# Patient Record
Sex: FEMALE | Race: OTHER | Employment: OTHER | URBAN - METROPOLITAN AREA
[De-identification: names, ages, dates, MRNs, and addresses within clinical notes are randomized per-mention and may not be internally consistent; named-entity substitution may affect disease eponyms.]

---

## 2021-05-04 ENCOUNTER — APPOINTMENT (OUTPATIENT)
Dept: CT IMAGING | Facility: HOSPITAL | Age: 86
End: 2021-05-04
Attending: EMERGENCY MEDICINE
Payer: MEDICAID

## 2021-05-04 ENCOUNTER — APPOINTMENT (OUTPATIENT)
Dept: GENERAL RADIOLOGY | Facility: HOSPITAL | Age: 86
End: 2021-05-04
Attending: EMERGENCY MEDICINE
Payer: MEDICAID

## 2021-05-04 PROCEDURE — 71045 X-RAY EXAM CHEST 1 VIEW: CPT | Performed by: EMERGENCY MEDICINE

## 2021-05-04 PROCEDURE — 70450 CT HEAD/BRAIN W/O DYE: CPT | Performed by: EMERGENCY MEDICINE

## 2021-05-04 NOTE — ED PROVIDER NOTES
Patient Seen in: [facility] Emergency Department      History   Patient presents with:  Altered Mental Status  Abnormal Result    Stated Complaint: concerns for abnormal mental behavior and abnormal electrolyte levels, sodium 1*    HPI/Subjective: Temp src Temporal   SpO2 100 %   O2 Device None (Room air)       Current:BP (!) 161/59   Pulse 51   Temp 98.1 °F (36.7 °C) (Temporal)   Resp 13   Ht 152.4 cm (5')   Wt 70.3 kg   SpO2 100%   BMI 30.27 kg/m²         Physical Exam  Vitals and nursing note r were created for panel order CBC WITH DIFFERENTIAL WITH PLATELET.   Procedure                               Abnormality         Status                     ---------                               -----------         ------                     CBC W/ DIFFERALEXIS recommend MRI of the brain with diffusion-weighted imaging for further evaluation.    Dictated by (CST): Ghassan Aguilar MD on 5/04/2021 at 1:33 PM     Finalized by (CST): Ghassan Aguilar MD on 5/04/2021 at 1:34 PM       XR CHEST AP PORTABLE  (CPT=71045)    Result Da Disposition and Plan     Clinical Impression:  Cognitive and behavioral changes  (primary encounter diagnosis)  Hyponatremia  Iron deficiency anemia, unspecified iron deficiency anemia type     Disposition:  Discharge  5/4/2021  3:49 pm    Follow-u

## 2021-05-04 NOTE — ED INITIAL ASSESSMENT (HPI)
Alert, awake per son pt had low sodium levels while in [facility] last week, has been confused since then, unknown sodium level today, also very swollen fingers where she is unable to  stuff, Pt quiet with no complaints at this time. Denies chest pain or

## 2021-05-10 ENCOUNTER — OFFICE VISIT (OUTPATIENT)
Dept: NEUROLOGY | Facility: CLINIC | Age: 86
End: 2021-05-10

## 2021-05-10 VITALS
WEIGHT: 154 LBS | BODY MASS INDEX: 30 KG/M2 | HEART RATE: 60 BPM | RESPIRATION RATE: 16 BRPM | SYSTOLIC BLOOD PRESSURE: 116 MMHG | DIASTOLIC BLOOD PRESSURE: 60 MMHG

## 2021-05-10 DIAGNOSIS — R41.0 CONFUSION: Primary | ICD-10-CM

## 2021-05-10 DIAGNOSIS — R26.9 GAIT DISTURBANCE: ICD-10-CM

## 2021-05-10 DIAGNOSIS — R41.0 DISORIENTATION: ICD-10-CM

## 2021-05-10 DIAGNOSIS — E87.1 HYPONATREMIA: ICD-10-CM

## 2021-05-10 DIAGNOSIS — R41.3 SHORT-TERM MEMORY LOSS: ICD-10-CM

## 2021-05-10 PROCEDURE — 3078F DIAST BP <80 MM HG: CPT | Performed by: OTHER

## 2021-05-10 PROCEDURE — 3074F SYST BP LT 130 MM HG: CPT | Performed by: OTHER

## 2021-05-10 PROCEDURE — 99204 OFFICE O/P NEW MOD 45 MIN: CPT | Performed by: OTHER

## 2021-05-10 RX ORDER — ATENOLOL 50 MG/1
50 TABLET ORAL DAILY
COMMUNITY
End: 2021-07-26

## 2021-05-10 NOTE — PROGRESS NOTES
Patient's son states patient's hands were very swollen 2 weeks ago and she was prescribed Telmiride in [facility] and her sodium was very low so son brought her back here.  Since then, she doesn't remember what she is supposed to do, hs had very poor short term

## 2021-05-10 NOTE — PROGRESS NOTES
Michael [address]   Neurology; INITIAL CLINIC VISIT  5/10/2021, 2:51 PM     Vearl Gilford Patient Status:  No patient class for patient encounter    1935 YONY [id number]   Location [address] [place] [address] does not drink alcohol and does not use drugs. ALLERGIES:  No Known Allergies    MEDICATIONS:  Current Outpatient Medications   Medication Sig Dispense Refill   • atenolol 50 MG Oral Tab Take 50 mg by mouth daily.      • aspirin 81 MG Oral Tab Take 81 mg follows one step of commends, very slow, with visual cue, full MMS is not able to do at this time,   Cranial Nerves       CN II:  Visual fields full, Pupils equal and reactive, Fundi normal       CN III, IV, VI:  Horrizontal and vertical movements normal. loss  Plan: MRI BRAIN (W+WO) (CPT=70553), EEG    (R26.9) Gait disturbance  Plan: OP REFERRAL TO [facility]        [place]:  Clinically, This  patient presented with mental confusion last months, loss of short term memory, I feel she ha

## 2021-05-29 ENCOUNTER — APPOINTMENT (OUTPATIENT)
Dept: ULTRASOUND IMAGING | Facility: HOSPITAL | Age: 86
End: 2021-05-29
Attending: EMERGENCY MEDICINE
Payer: MEDICAID

## 2021-05-29 ENCOUNTER — APPOINTMENT (OUTPATIENT)
Dept: CT IMAGING | Facility: HOSPITAL | Age: 86
End: 2021-05-29
Attending: EMERGENCY MEDICINE
Payer: MEDICAID

## 2021-05-29 ENCOUNTER — HOSPITAL ENCOUNTER (EMERGENCY)
Facility: HOSPITAL | Age: 86
Discharge: HOME | End: 2021-05-29
Attending: EMERGENCY MEDICINE
Payer: MEDICAID

## 2021-05-29 VITALS
WEIGHT: 145 LBS | TEMPERATURE: 99 F | SYSTOLIC BLOOD PRESSURE: 139 MMHG | BODY MASS INDEX: 30.44 KG/M2 | HEART RATE: 77 BPM | HEIGHT: 58 IN | OXYGEN SATURATION: 100 % | DIASTOLIC BLOOD PRESSURE: 78 MMHG | RESPIRATION RATE: 17 BRPM

## 2021-05-29 DIAGNOSIS — M79.89 SWELLING OF ARM: Primary | ICD-10-CM

## 2021-05-29 DIAGNOSIS — E87.1 HYPONATREMIA: ICD-10-CM

## 2021-05-29 PROCEDURE — 99284 EMERGENCY DEPT VISIT MOD MDM: CPT

## 2021-05-29 PROCEDURE — 80053 COMPREHEN METABOLIC PANEL: CPT | Performed by: EMERGENCY MEDICINE

## 2021-05-29 PROCEDURE — 85025 COMPLETE CBC W/AUTO DIFF WBC: CPT | Performed by: EMERGENCY MEDICINE

## 2021-05-29 PROCEDURE — 71275 CT ANGIOGRAPHY CHEST: CPT | Performed by: EMERGENCY MEDICINE

## 2021-05-29 PROCEDURE — 96361 HYDRATE IV INFUSION ADD-ON: CPT

## 2021-05-29 PROCEDURE — 93971 EXTREMITY STUDY: CPT | Performed by: EMERGENCY MEDICINE

## 2021-05-29 PROCEDURE — 99285 EMERGENCY DEPT VISIT HI MDM: CPT

## 2021-05-29 PROCEDURE — 96360 HYDRATION IV INFUSION INIT: CPT

## 2021-05-29 RX ORDER — SODIUM CHLORIDE 9 MG/ML
125 INJECTION, SOLUTION INTRAVENOUS CONTINUOUS
Status: DISCONTINUED | OUTPATIENT
Start: 2021-05-29 | End: 2021-05-29

## 2021-05-29 NOTE — ED INITIAL ASSESSMENT (HPI)
R hand swelling that comes and goes since Monday.  Had 2nd COVID shot in same arm yesterday  Denies pain (aside from vaccine)  CMS intact, pulse normal

## 2021-05-30 NOTE — ED PROVIDER NOTES
Patient Seen in: [facility] Emergency Department      History   Patient presents with:  Swelling Edema    Stated Complaint: swelling to hand    HPI/Subjective:   HPI    Swelling of right hand- pain in hand and forearm, the swelling actually started as noted above.     Physical Exam     ED Triage Vitals [05/29/21 1441]   /82   Pulse 86   Resp 17   Temp 99.3 °F (37.4 °C)   Temp src Temporal   SpO2 100 %   O2 Device None (Room air)       Current:/78   Pulse 77   Temp 99.3 °F (37.4 °C) (Tempor BUN/CREA Ratio 23.6 (*)     GFR, Non- 59 (*)     Albumin 2.9 (*)     Globulin  4.6 (*)     A/G Ratio 0.6 (*)     All other components within normal limits   CBC W/ DIFFERENTIAL - Abnormal; Notable for the following components:    HGB 10.8 ( calcification noted. PLEURA:  No mass or effusion. CHEST WALL:  No mass or axillary adenopathy. LIMITED ABDOMEN:  Small hiatal hernia. BONES:  No bony lesion or fracture.       OTHER:  2 x 1 cm low CT density mass posterior right lobe of thy well.  This makes me very concerned that this could be a Pancoast tumor in her upper chest chest x-ray was benign, discussed the case with radiology recommended a Doppler of the arm especially with the elevated D-dimer in [facility] to make sure that this is no

## 2021-06-11 ENCOUNTER — OFFICE VISIT (OUTPATIENT)
Dept: FAMILY MEDICINE CLINIC | Facility: CLINIC | Age: 86
End: 2021-06-11
Payer: MEDICAID

## 2021-06-11 VITALS
DIASTOLIC BLOOD PRESSURE: 68 MMHG | BODY MASS INDEX: 30.02 KG/M2 | RESPIRATION RATE: 15 BRPM | SYSTOLIC BLOOD PRESSURE: 142 MMHG | HEIGHT: 58 IN | OXYGEN SATURATION: 97 % | HEART RATE: 78 BPM | WEIGHT: 143 LBS | TEMPERATURE: 98 F

## 2021-06-11 DIAGNOSIS — E87.1 HYPONATREMIA: ICD-10-CM

## 2021-06-11 DIAGNOSIS — F41.9 ANXIETY: ICD-10-CM

## 2021-06-11 DIAGNOSIS — I10 ESSENTIAL HYPERTENSION: ICD-10-CM

## 2021-06-11 DIAGNOSIS — R22.31 LOCALIZED SWELLING ON RIGHT HAND: ICD-10-CM

## 2021-06-11 DIAGNOSIS — Z82.69 FAMILY HISTORY OF GOUT: ICD-10-CM

## 2021-06-11 DIAGNOSIS — R41.3 SHORT-TERM MEMORY LOSS: ICD-10-CM

## 2021-06-11 DIAGNOSIS — Z00.00 ANNUAL PHYSICAL EXAM: Primary | ICD-10-CM

## 2021-06-11 PROCEDURE — 3077F SYST BP >= 140 MM HG: CPT | Performed by: FAMILY MEDICINE

## 2021-06-11 PROCEDURE — 99387 INIT PM E/M NEW PAT 65+ YRS: CPT | Performed by: FAMILY MEDICINE

## 2021-06-11 PROCEDURE — 3008F BODY MASS INDEX DOCD: CPT | Performed by: FAMILY MEDICINE

## 2021-06-11 PROCEDURE — 3078F DIAST BP <80 MM HG: CPT | Performed by: FAMILY MEDICINE

## 2021-06-11 RX ORDER — ATENOLOL 50 MG/1
50 TABLET ORAL DAILY
Qty: 90 TABLET | Refills: 0 | Status: SHIPPED | OUTPATIENT
Start: 2021-06-11 | End: 2021-08-27

## 2021-06-11 RX ORDER — CITALOPRAM 10 MG/1
10 TABLET ORAL DAILY
Qty: 90 TABLET | Refills: 0 | Status: SHIPPED | OUTPATIENT
Start: 2021-06-11 | End: 2021-08-27

## 2021-06-11 RX ORDER — AMLODIPINE BESYLATE 5 MG/1
5 TABLET ORAL DAILY
Qty: 90 TABLET | Refills: 0 | Status: SHIPPED | OUTPATIENT
Start: 2021-06-11 | End: 2021-07-26

## 2021-06-11 NOTE — PROGRESS NOTES
Patient presents with:  Physical: Annual      HPI:   Levi Floyd is a 80year old female who presents for a complete physical exam.  She is a new patient. 2 month ago she was in [facility]. Was having a lot of swelling.   In the ER doctor prescribed diure • aspirin 81 MG Oral Tab Take 81 mg by mouth daily.         Past Medical History:   Diagnosis Date   • Other and unspecified hyperlipidemia    • Unspecified essential hypertension       Past Surgical History:   Procedure Laterality Date   • KNEE REPLACEM possible. Right now she is dependent. 2. Essential hypertension  BP in the 265X systolic at home. A little better here. We will convert her medicines to the American equivalents. Son will update me with home blood pressures.   If they persist high,

## 2021-06-12 ENCOUNTER — HOSPITAL ENCOUNTER (OUTPATIENT)
Dept: MRI IMAGING | Facility: HOSPITAL | Age: 86
Discharge: HOME | End: 2021-06-12
Attending: Other
Payer: MEDICAID

## 2021-06-12 ENCOUNTER — LAB ENCOUNTER (OUTPATIENT)
Dept: LAB | Facility: HOSPITAL | Age: 86
End: 2021-06-12
Attending: FAMILY MEDICINE
Payer: MEDICAID

## 2021-06-12 DIAGNOSIS — Z82.69 FAMILY HISTORY OF GOUT: ICD-10-CM

## 2021-06-12 DIAGNOSIS — R22.31 LOCALIZED SWELLING ON RIGHT HAND: ICD-10-CM

## 2021-06-12 DIAGNOSIS — R41.3 SHORT-TERM MEMORY LOSS: ICD-10-CM

## 2021-06-12 DIAGNOSIS — I10 ESSENTIAL HYPERTENSION: ICD-10-CM

## 2021-06-12 PROCEDURE — 86038 ANTINUCLEAR ANTIBODIES: CPT

## 2021-06-12 PROCEDURE — A9575 INJ GADOTERATE MEGLUMI 0.1ML: HCPCS | Performed by: OTHER

## 2021-06-12 PROCEDURE — 36415 COLL VENOUS BLD VENIPUNCTURE: CPT

## 2021-06-12 PROCEDURE — 85652 RBC SED RATE AUTOMATED: CPT

## 2021-06-12 PROCEDURE — 86431 RHEUMATOID FACTOR QUANT: CPT

## 2021-06-12 PROCEDURE — 80053 COMPREHEN METABOLIC PANEL: CPT

## 2021-06-12 PROCEDURE — 84550 ASSAY OF BLOOD/URIC ACID: CPT

## 2021-06-12 PROCEDURE — 86200 CCP ANTIBODY: CPT

## 2021-06-12 PROCEDURE — 86140 C-REACTIVE PROTEIN: CPT

## 2021-06-12 PROCEDURE — 70553 MRI BRAIN STEM W/O & W/DYE: CPT | Performed by: OTHER

## 2021-06-15 ENCOUNTER — TELEPHONE (OUTPATIENT)
Dept: NEUROLOGY | Facility: CLINIC | Age: 86
End: 2021-06-15

## 2021-06-25 ENCOUNTER — NURSE ONLY (OUTPATIENT)
Dept: ELECTROPHYSIOLOGY | Facility: HOSPITAL | Age: 86
End: 2021-06-25
Attending: Other
Payer: MEDICAID

## 2021-06-25 DIAGNOSIS — R41.3 SHORT-TERM MEMORY LOSS: ICD-10-CM

## 2021-06-25 PROCEDURE — 95819 EEG AWAKE AND ASLEEP: CPT | Performed by: OTHER

## 2021-06-26 NOTE — PROCEDURES
[facility], [address] [place] [address]      PATIENT'S NAME: [place] [address] Kiana Olmedo Médicis PHYSICIAN: Dorian Mathews M.D.    PATIENT ACCOUNT #: [de-identified] LOCATION: [facility]   MEDICAL RECORD #: [id number] YOB: 1935

## 2021-06-29 ENCOUNTER — TELEPHONE (OUTPATIENT)
Dept: NEUROLOGY | Facility: CLINIC | Age: 86
End: 2021-06-29

## 2021-07-06 ENCOUNTER — APPOINTMENT (OUTPATIENT)
Dept: PHYSICAL THERAPY | Facility: HOSPITAL | Age: 86
End: 2021-07-06
Attending: Other
Payer: MEDICAID

## 2021-07-07 NOTE — PROGRESS NOTES
NEUROLOGICAL EVALUATION:   Referring Physician: Dr. Juan Roche  Diagnosis: Gait disturbance (R26.9)        Date of Service: 7/7/2021     PATIENT SUMMARY   Hadley Snellen is a 80year old female who presents to therapy today with complaints of declines in mobil decreased static and dynamic balance, impaired functional strength for stairs. Patient is at an elevated fall risk based on TUG, tandem stance, SLS, and FGA.  Functional deficits include but are not limited to ambulating on uneven surface or in narrow space reduced speed, short step length, often carrying SPC. Timed Up and Go (AD, time): 16.9s SPC sec  Fall Risk: yes    Functional Gait Assessment   Item Description Score (0 worst, 3 best)     ____1___1.  Gait Level Surface-  Time: ____>10s______seconds  ___2 ambulation  · Pt will improve FGA score to >22 to demonstrate decreased fall risk. · Pt will improve tandem stance to >10s to demonstrate reduced fall risk.    · Pt will be able to get up from the floor with mod I.  · Pt will be able to go up stairs with

## 2021-07-08 ENCOUNTER — OFFICE VISIT (OUTPATIENT)
Dept: PHYSICAL THERAPY | Facility: HOSPITAL | Age: 86
End: 2021-07-08
Attending: Other
Payer: MEDICAID

## 2021-07-08 DIAGNOSIS — R26.9 GAIT DISTURBANCE: ICD-10-CM

## 2021-07-08 PROCEDURE — 97110 THERAPEUTIC EXERCISES: CPT

## 2021-07-08 PROCEDURE — 97161 PT EVAL LOW COMPLEX 20 MIN: CPT

## 2021-07-12 ENCOUNTER — OFFICE VISIT (OUTPATIENT)
Dept: PHYSICAL THERAPY | Facility: HOSPITAL | Age: 86
End: 2021-07-12
Attending: Other
Payer: MEDICAID

## 2021-07-12 DIAGNOSIS — R26.9 GAIT DISTURBANCE: ICD-10-CM

## 2021-07-12 PROCEDURE — 97110 THERAPEUTIC EXERCISES: CPT

## 2021-07-12 PROCEDURE — 97112 NEUROMUSCULAR REEDUCATION: CPT

## 2021-07-12 NOTE — PROGRESS NOTES
Dx: Gait disturbance (R26.9)            Insurance (Authorized # of Visits):  [facility] 8 through 5/10/2022     Authorizing Physician: Dr. Mcclure Or  Next MD visit: none scheduled  Fall Risk: elevated         Precautions: Fall Risk, communication-requires  \"catching self\". Once stopped at end, patient also has posterior LOB requiring [place] to maintain.  Son has family goal of working on patient getting up from ground as patient had a recent fall out of [place] and son and grandson had to pick her up off of [place] TX#: 3/ Date:                 TX#: 4/ Date:                 TX#: 5/ Date:    Tx#: 6/   TE:   Nu-step x 5', L4, ubj hx  FSU 4\" step, no UEs x10 ea, CGA   LSU 4\" step and over, no UEs, 2 x 5, CGA  Mini lunge  X 8 ea with UE support and tactile cues for po

## 2021-07-14 ENCOUNTER — OFFICE VISIT (OUTPATIENT)
Dept: PHYSICAL THERAPY | Facility: HOSPITAL | Age: 86
End: 2021-07-14
Attending: Other
Payer: MEDICAID

## 2021-07-14 PROCEDURE — 97112 NEUROMUSCULAR REEDUCATION: CPT

## 2021-07-14 PROCEDURE — 97110 THERAPEUTIC EXERCISES: CPT

## 2021-07-14 NOTE — PROGRESS NOTES
Dx: Gait disturbance (R26.9)            Insurance (Authorized # of Visits):  [facility] 8 through 5/10/2022     Authorizing Physician: Dr. Domi Art  Next MD visit: none scheduled  Fall Risk: elevated         Precautions: Fall Risk, communication-requires  lowered step next visit but no UEs. Progressed to two reps of 2' crawl on mat table to work on tolerance and eventually progress to attempt of floor transfer if safe due to family goals.  Added FSU to home program as well and progressed to 3/4 tandem and te 43#  2 x 15  FSU 6\" step, unilateral UE x10 ea, CGA   LSU 6\" step and over, BUEs, 2 x 5, CGA  HEP progression with handout        NM:  Sand dune:   *march 2 x 10, BUE support  Mod tandem ball throw CGA safety  Retro walking with CGA at belt 4 x 25', Abimbola

## 2021-07-19 ENCOUNTER — OFFICE VISIT (OUTPATIENT)
Dept: PHYSICAL THERAPY | Facility: HOSPITAL | Age: 86
End: 2021-07-19
Attending: Other
Payer: MEDICAID

## 2021-07-19 PROCEDURE — 97112 NEUROMUSCULAR REEDUCATION: CPT

## 2021-07-19 PROCEDURE — 97110 THERAPEUTIC EXERCISES: CPT

## 2021-07-19 NOTE — PROGRESS NOTES
Dx: Gait disturbance (R26.9)            Insurance (Authorized # of Visits):  [facility] 8 through 5/10/2022     Authorizing Physician: Dr. Joaquín Cortez  Next MD visit: none scheduled  Fall Risk: elevated         Precautions: Fall Risk, communication-requires  difficult than mat table and PT does not want to try floor transfer if not tolerating mat table transfer. Held on leg press this date but continued step ups in // bars. Work with hurdles and airex both lateral and forward to improve stability.  CGA to SBA w 13  FSU 6\" step, unilateral UE x10 ea, CGA   LSU 6\" step and over, BUEs, 2 x 5, CGA  HEP progression with handout   TE:   Nu-step x 5', L5, subj hx  6\" step, unilateral UE, 2 x 8 ea        NM:  Sand dune:   *march 2 x 10, BUE support  Mod tandem ball th

## 2021-07-21 ENCOUNTER — TELEPHONE (OUTPATIENT)
Dept: FAMILY MEDICINE CLINIC | Facility: CLINIC | Age: 86
End: 2021-07-21

## 2021-07-21 ENCOUNTER — APPOINTMENT (OUTPATIENT)
Dept: PHYSICAL THERAPY | Facility: HOSPITAL | Age: 86
End: 2021-07-21
Attending: Other
Payer: MEDICAID

## 2021-07-21 NOTE — TELEPHONE ENCOUNTER
Talked to son and went over what is going on made appointment for 7/26/21 with Dr Tamanna Wilson this is the soonest opening he had

## 2021-07-21 NOTE — TELEPHONE ENCOUNTER
Pt's son is calling pt is having swelling in both legs no redness but warmth to the legs. She has been to the urgent care but still persisting.

## 2021-07-26 ENCOUNTER — OFFICE VISIT (OUTPATIENT)
Dept: FAMILY MEDICINE CLINIC | Facility: CLINIC | Age: 86
End: 2021-07-26
Payer: MEDICAID

## 2021-07-26 ENCOUNTER — OFFICE VISIT (OUTPATIENT)
Dept: PHYSICAL THERAPY | Facility: HOSPITAL | Age: 86
End: 2021-07-26
Attending: Other
Payer: MEDICAID

## 2021-07-26 VITALS
RESPIRATION RATE: 17 BRPM | HEART RATE: 66 BPM | TEMPERATURE: 99 F | DIASTOLIC BLOOD PRESSURE: 60 MMHG | HEIGHT: 58 IN | SYSTOLIC BLOOD PRESSURE: 132 MMHG | OXYGEN SATURATION: 98 % | BODY MASS INDEX: 30.64 KG/M2 | WEIGHT: 146 LBS

## 2021-07-26 DIAGNOSIS — I10 ESSENTIAL HYPERTENSION: Primary | ICD-10-CM

## 2021-07-26 PROCEDURE — 97110 THERAPEUTIC EXERCISES: CPT

## 2021-07-26 PROCEDURE — 97112 NEUROMUSCULAR REEDUCATION: CPT

## 2021-07-26 PROCEDURE — 3075F SYST BP GE 130 - 139MM HG: CPT | Performed by: FAMILY MEDICINE

## 2021-07-26 PROCEDURE — 99213 OFFICE O/P EST LOW 20 MIN: CPT | Performed by: FAMILY MEDICINE

## 2021-07-26 PROCEDURE — 3008F BODY MASS INDEX DOCD: CPT | Performed by: FAMILY MEDICINE

## 2021-07-26 PROCEDURE — 3078F DIAST BP <80 MM HG: CPT | Performed by: FAMILY MEDICINE

## 2021-07-26 RX ORDER — LOSARTAN POTASSIUM 50 MG/1
50 TABLET ORAL DAILY
Qty: 90 TABLET | Refills: 0 | Status: SHIPPED | OUTPATIENT
Start: 2021-07-26 | End: 2021-08-27

## 2021-07-26 NOTE — PROGRESS NOTES
Dx: Gait disturbance (R26.9)            Insurance (Authorized # of Visits):  [address] [facility] [address] 8 through 5/10/2022     Authorizing Physician: Dr. Joaquín Ortiz MD visit: none scheduled  Fall Risk: elevated         Precautions: Fall Risk, communication-requires  16.9s at evaluation to 12.17s this date demonstrating reduced fall risk. Re-assessment on stairs this date and significantly improved safety with less UE pull on stairs.  Cues for end reps to maintain more anterior trunk lean to decrease UE pull and for who Nu-step x 5', L4, ubj hx  FSU 4\" step, no UEs x10 ea, CGA   LSU 4\" step and over, no UEs, 2 x 5, CGA  Mini lunge  X 8 ea with UE support and tactile cues for posterior knee bend TE:   Nu-step x 5', L4, subj hx  BLE leg press: 43#  2 x 15  FSU 6\" step,

## 2021-07-26 NOTE — PROGRESS NOTES
Patient presents with:  Swelling: Bilateral Leg Swelling     HPI:   Anthony Humphries is a 80year old female who presents to the office for ongoing welling and BP check  Taking amlodipine 10mg. This is a newer med for her.   Since increasing to 10 mg has had

## 2021-07-28 ENCOUNTER — APPOINTMENT (OUTPATIENT)
Dept: PHYSICAL THERAPY | Facility: HOSPITAL | Age: 86
End: 2021-07-28
Attending: Other
Payer: MEDICAID

## 2021-07-30 NOTE — PROGRESS NOTES
Discharge Summary  Dx: Gait disturbance (R26.9)            Insurance (Authorized # of Visits):  [facility] 8 through 5/10/2022     Authorizing Physician: Dr. Samm Landaverde  Next MD visit: none scheduled  Fall Risk: elevated         Precautions: Fall Risk, communication- met below. Deferred floor transfer due to patient intolerance for quadruped due to knee pain. Re-assessment on stairs this date and significantly improved safety with less UE pull on stairs.  Educated son on appropriate cues to give to patient such as forwa TE:   Nu-step x 5', L4, ubj hx  FSU 4\" step, no UEs x10 ea, CGA   LSU 4\" step and over, no UEs, 2 x 5, CGA  Mini lunge  X 8 ea with UE support and tactile cues for posterior knee bend TE:   Nu-step x 5', L4, subj hx  BLE leg press: 43#  2 x 15  FSU 6\"

## 2021-08-02 ENCOUNTER — OFFICE VISIT (OUTPATIENT)
Dept: PHYSICAL THERAPY | Facility: HOSPITAL | Age: 86
End: 2021-08-02
Attending: Other
Payer: MEDICAID

## 2021-08-02 PROCEDURE — 97112 NEUROMUSCULAR REEDUCATION: CPT

## 2021-08-02 PROCEDURE — 97110 THERAPEUTIC EXERCISES: CPT

## 2021-08-04 ENCOUNTER — APPOINTMENT (OUTPATIENT)
Dept: PHYSICAL THERAPY | Facility: HOSPITAL | Age: 86
End: 2021-08-04
Attending: Other
Payer: MEDICAID

## 2021-08-09 ENCOUNTER — APPOINTMENT (OUTPATIENT)
Dept: PHYSICAL THERAPY | Facility: HOSPITAL | Age: 86
End: 2021-08-09
Attending: Other
Payer: MEDICAID

## 2021-08-11 ENCOUNTER — APPOINTMENT (OUTPATIENT)
Dept: PHYSICAL THERAPY | Facility: HOSPITAL | Age: 86
End: 2021-08-11
Attending: Other
Payer: MEDICAID

## 2021-08-27 ENCOUNTER — OFFICE VISIT (OUTPATIENT)
Dept: FAMILY MEDICINE CLINIC | Facility: CLINIC | Age: 86
End: 2021-08-27
Payer: MEDICAID

## 2021-08-27 VITALS
DIASTOLIC BLOOD PRESSURE: 70 MMHG | OXYGEN SATURATION: 97 % | WEIGHT: 146.81 LBS | RESPIRATION RATE: 16 BRPM | TEMPERATURE: 98 F | BODY MASS INDEX: 30.82 KG/M2 | SYSTOLIC BLOOD PRESSURE: 180 MMHG | HEIGHT: 58 IN | HEART RATE: 68 BPM

## 2021-08-27 DIAGNOSIS — I10 ESSENTIAL HYPERTENSION: Primary | ICD-10-CM

## 2021-08-27 DIAGNOSIS — J30.2 SEASONAL ALLERGIES: ICD-10-CM

## 2021-08-27 DIAGNOSIS — F41.9 ANXIETY: ICD-10-CM

## 2021-08-27 DIAGNOSIS — S46.001A INJURY OF RIGHT ROTATOR CUFF, INITIAL ENCOUNTER: ICD-10-CM

## 2021-08-27 PROCEDURE — 99214 OFFICE O/P EST MOD 30 MIN: CPT | Performed by: FAMILY MEDICINE

## 2021-08-27 PROCEDURE — 3077F SYST BP >= 140 MM HG: CPT | Performed by: FAMILY MEDICINE

## 2021-08-27 PROCEDURE — 3078F DIAST BP <80 MM HG: CPT | Performed by: FAMILY MEDICINE

## 2021-08-27 PROCEDURE — 3008F BODY MASS INDEX DOCD: CPT | Performed by: FAMILY MEDICINE

## 2021-08-27 RX ORDER — ATENOLOL 50 MG/1
50 TABLET ORAL DAILY
Qty: 90 TABLET | Refills: 0 | Status: SHIPPED | OUTPATIENT
Start: 2021-08-27 | End: 2021-08-30

## 2021-08-27 RX ORDER — LOSARTAN POTASSIUM 50 MG/1
50 TABLET ORAL DAILY
Qty: 90 TABLET | Refills: 0 | Status: SHIPPED | OUTPATIENT
Start: 2021-08-27 | End: 2021-09-24

## 2021-08-27 RX ORDER — CITALOPRAM 10 MG/1
10 TABLET ORAL DAILY
Qty: 90 TABLET | Refills: 3 | Status: SHIPPED | OUTPATIENT
Start: 2021-08-27

## 2021-08-27 NOTE — PROGRESS NOTES
Patient presents with: Follow - Up: Blood Pressure      HPI:   Lindsay Garrison is a 80year old female who presents to the office for BP check up  Mildly elevated in June 156/70  On repeat BP better, but side effects of amlodipine, needed to stop.    Now of that she may have a degree of whitecoat hypertension and would be hesitant to treat her without knowing home readings. Check at home. She is on atenolol 50 and losartan 50. Both of these could increase.   Given her familiarity with atenolol and normal he

## 2021-08-28 DIAGNOSIS — I10 ESSENTIAL HYPERTENSION: ICD-10-CM

## 2021-08-29 ENCOUNTER — HOSPITAL ENCOUNTER (EMERGENCY)
Facility: HOSPITAL | Age: 86
Discharge: HOME | End: 2021-08-29
Attending: EMERGENCY MEDICINE
Payer: MEDICAID

## 2021-08-29 ENCOUNTER — APPOINTMENT (OUTPATIENT)
Dept: GENERAL RADIOLOGY | Facility: HOSPITAL | Age: 86
End: 2021-08-29
Attending: EMERGENCY MEDICINE
Payer: MEDICAID

## 2021-08-29 VITALS
TEMPERATURE: 98 F | HEART RATE: 66 BPM | RESPIRATION RATE: 18 BRPM | DIASTOLIC BLOOD PRESSURE: 62 MMHG | OXYGEN SATURATION: 100 % | SYSTOLIC BLOOD PRESSURE: 175 MMHG

## 2021-08-29 DIAGNOSIS — R06.00 DOE (DYSPNEA ON EXERTION): ICD-10-CM

## 2021-08-29 DIAGNOSIS — I10 HYPERTENSION, UNSPECIFIED TYPE: Primary | ICD-10-CM

## 2021-08-29 LAB
ALBUMIN SERPL-MCNC: 3 G/DL (ref 3.4–5)
ALBUMIN/GLOB SERPL: 0.8 {RATIO} (ref 1–2)
ALP LIVER SERPL-CCNC: 100 U/L
ALT SERPL-CCNC: 13 U/L
ANION GAP SERPL CALC-SCNC: 4 MMOL/L (ref 0–18)
AST SERPL-CCNC: 14 U/L (ref 15–37)
ATRIAL RATE: 60 BPM
BASOPHILS # BLD AUTO: 0.06 X10(3) UL (ref 0–0.2)
BASOPHILS NFR BLD AUTO: 1 %
BILIRUB SERPL-MCNC: 0.5 MG/DL (ref 0.1–2)
BUN BLD-MCNC: 14 MG/DL (ref 7–18)
CALCIUM BLD-MCNC: 9.6 MG/DL (ref 8.5–10.1)
CHLORIDE SERPL-SCNC: 105 MMOL/L (ref 98–112)
CO2 SERPL-SCNC: 28 MMOL/L (ref 21–32)
CREAT BLD-MCNC: 0.79 MG/DL
EOSINOPHIL # BLD AUTO: 0.15 X10(3) UL (ref 0–0.7)
EOSINOPHIL NFR BLD AUTO: 2.4 %
ERYTHROCYTE [DISTWIDTH] IN BLOOD BY AUTOMATED COUNT: 14.6 %
GLOBULIN PLAS-MCNC: 4 G/DL (ref 2.8–4.4)
GLUCOSE BLD-MCNC: 95 MG/DL (ref 70–99)
HCT VFR BLD AUTO: 33.2 %
HGB BLD-MCNC: 11 G/DL
IMM GRANULOCYTES # BLD AUTO: 0.02 X10(3) UL (ref 0–1)
IMM GRANULOCYTES NFR BLD: 0.3 %
LYMPHOCYTES # BLD AUTO: 2 X10(3) UL (ref 1–4)
LYMPHOCYTES NFR BLD AUTO: 31.9 %
M PROTEIN MFR SERPL ELPH: 7 G/DL (ref 6.4–8.2)
MCH RBC QN AUTO: 26.7 PG (ref 26–34)
MCHC RBC AUTO-ENTMCNC: 33.1 G/DL (ref 31–37)
MCV RBC AUTO: 80.6 FL
MONOCYTES # BLD AUTO: 0.44 X10(3) UL (ref 0.1–1)
MONOCYTES NFR BLD AUTO: 7 %
NEUTROPHILS # BLD AUTO: 3.59 X10 (3) UL (ref 1.5–7.7)
NEUTROPHILS # BLD AUTO: 3.59 X10(3) UL (ref 1.5–7.7)
NEUTROPHILS NFR BLD AUTO: 57.4 %
NT-PROBNP SERPL-MCNC: 970 PG/ML (ref ?–450)
OSMOLALITY SERPL CALC.SUM OF ELEC: 284 MOSM/KG (ref 275–295)
P AXIS: 43 DEGREES
P-R INTERVAL: 158 MS
PLATELET # BLD AUTO: 336 10(3)UL (ref 150–450)
POTASSIUM SERPL-SCNC: 4.3 MMOL/L (ref 3.5–5.1)
Q-T INTERVAL: 448 MS
QRS DURATION: 98 MS
QTC CALCULATION (BEZET): 448 MS
R AXIS: 23 DEGREES
RBC # BLD AUTO: 4.12 X10(6)UL
SODIUM SERPL-SCNC: 137 MMOL/L (ref 136–145)
T AXIS: 30 DEGREES
TROPONIN I SERPL-MCNC: <0.045 NG/ML (ref ?–0.04)
VENTRICULAR RATE: 60 BPM
WBC # BLD AUTO: 6.3 X10(3) UL (ref 4–11)

## 2021-08-29 PROCEDURE — 84484 ASSAY OF TROPONIN QUANT: CPT | Performed by: EMERGENCY MEDICINE

## 2021-08-29 PROCEDURE — 85025 COMPLETE CBC W/AUTO DIFF WBC: CPT | Performed by: EMERGENCY MEDICINE

## 2021-08-29 PROCEDURE — 96376 TX/PRO/DX INJ SAME DRUG ADON: CPT

## 2021-08-29 PROCEDURE — 99284 EMERGENCY DEPT VISIT MOD MDM: CPT

## 2021-08-29 PROCEDURE — 80053 COMPREHEN METABOLIC PANEL: CPT | Performed by: EMERGENCY MEDICINE

## 2021-08-29 PROCEDURE — 93005 ELECTROCARDIOGRAM TRACING: CPT

## 2021-08-29 PROCEDURE — 93010 ELECTROCARDIOGRAM REPORT: CPT

## 2021-08-29 PROCEDURE — 83880 ASSAY OF NATRIURETIC PEPTIDE: CPT | Performed by: EMERGENCY MEDICINE

## 2021-08-29 PROCEDURE — 96374 THER/PROPH/DIAG INJ IV PUSH: CPT

## 2021-08-29 PROCEDURE — 71045 X-RAY EXAM CHEST 1 VIEW: CPT | Performed by: EMERGENCY MEDICINE

## 2021-08-29 RX ORDER — LOSARTAN POTASSIUM 100 MG/1
100 TABLET ORAL DAILY
Qty: 30 TABLET | Refills: 0 | Status: SHIPPED | OUTPATIENT
Start: 2021-08-29 | End: 2021-09-24

## 2021-08-29 RX ORDER — HYDRALAZINE HYDROCHLORIDE 20 MG/ML
5 INJECTION INTRAMUSCULAR; INTRAVENOUS ONCE
Status: COMPLETED | OUTPATIENT
Start: 2021-08-29 | End: 2021-08-29

## 2021-08-29 RX ORDER — HYDRALAZINE HYDROCHLORIDE 20 MG/ML
10 INJECTION INTRAMUSCULAR; INTRAVENOUS ONCE
Status: COMPLETED | OUTPATIENT
Start: 2021-08-29 | End: 2021-08-29

## 2021-08-29 RX ORDER — LOSARTAN POTASSIUM 50 MG/1
50 TABLET ORAL DAILY
Status: DISCONTINUED | OUTPATIENT
Start: 2021-08-29 | End: 2021-08-29

## 2021-08-29 RX ORDER — ACETAMINOPHEN 500 MG
1000 TABLET ORAL ONCE
Status: COMPLETED | OUTPATIENT
Start: 2021-08-29 | End: 2021-08-29

## 2021-08-29 NOTE — ED INITIAL ASSESSMENT (HPI)
Pt in with son, pt has Hx HTN and BP has been elevated. Pt c/o HA with fatigue, without blurry vision.  Denies N/V

## 2021-08-29 NOTE — ED PROVIDER NOTES
Patient Seen in: [facility] Emergency Department      History   Patient presents with:  Hypertension    Stated Complaint: Pt presents to ED with hypertension. Bp was 213/104 at 0755 this morning.       HPI/Subjective:   HPI    59-year-old female was (36.6 °C)   Resp 18   SpO2 100%         Physical Exam    General appearance: This is an elderly female lying in a gurney in no apparent distress. Vital signs were reviewed per nurse's notes. Initial blood pressure was 224/79.   Respirations are 17 and unl enlargement. Low voltage QRS. Borderline EKG. Agree with EKG report. The patient was given intravenous hydralazine and blood pressure decreased to approximately 170/60. Test results and treatment plan were discussed with the patient's son.

## 2021-08-30 ENCOUNTER — TELEPHONE (OUTPATIENT)
Dept: FAMILY MEDICINE CLINIC | Facility: CLINIC | Age: 86
End: 2021-08-30

## 2021-08-30 DIAGNOSIS — I10 ESSENTIAL HYPERTENSION: Primary | ICD-10-CM

## 2021-08-30 RX ORDER — ATENOLOL 50 MG/1
TABLET ORAL
Qty: 90 TABLET | Refills: 0 | Status: SHIPPED | OUTPATIENT
Start: 2021-08-30 | End: 2021-11-30

## 2021-08-30 RX ORDER — SPIRONOLACTONE 25 MG/1
25 TABLET ORAL DAILY
Qty: 30 TABLET | Refills: 0 | Status: SHIPPED | OUTPATIENT
Start: 2021-08-30 | End: 2021-09-27

## 2021-08-30 NOTE — TELEPHONE ENCOUNTER
Patient's son called ( he is on hipaa ) patient was seen in ER yesterday with high BP, told to follow up with PCP, patient's BP still high this morning, doesn't want to take mom back to ER

## 2021-08-30 NOTE — TELEPHONE ENCOUNTER
im going to add a medicine in now - once daily  Let's see how it goes  Med called in  - spironolactone.

## 2021-08-30 NOTE — TELEPHONE ENCOUNTER
Patient's son states he has been giving Atenolol 50mg BID and Losartan 50mg BID since Friday 8/27. Pt went to ED yesterday due to elevated BP. BP this morning is 203/89 despite medication changes.      Pt advised to f/u in office today (no current openings)

## 2021-08-30 NOTE — TELEPHONE ENCOUNTER
Requested Prescriptions     Pending Prescriptions Disp Refills   • ATENOLOL 50 MG Oral Tab [Pharmacy Med Name: ATENOLOL 50MG TABLETS] 90 tablet 0     Sig: TAKE 1 TABLET(50 MG) BY MOUTH DAILY     LOV 8/27/2021     Patient was asked to follow-up in: Follow-u

## 2021-08-30 NOTE — TELEPHONE ENCOUNTER
Pt's son is calling mother's BP was 225/104 and he took her to ER yesterday and now they want her to follow up today with Dr. Mady Hayward.

## 2021-09-04 DIAGNOSIS — I10 ESSENTIAL HYPERTENSION: ICD-10-CM

## 2021-09-07 RX ORDER — AMLODIPINE BESYLATE 5 MG/1
TABLET ORAL
Qty: 90 TABLET | Refills: 0 | Status: SHIPPED | OUTPATIENT
Start: 2021-09-07 | End: 2021-09-27 | Stop reason: DRUGHIGH

## 2021-09-07 NOTE — TELEPHONE ENCOUNTER
Requested Prescriptions     Pending Prescriptions Disp Refills   • AMLODIPINE 5 MG Oral Tab [Pharmacy Med Name: AMLODIPINE BESYLATE 5MG TABLETS] 90 tablet 0     Sig: TAKE 1 TABLET BY MOUTH DAILY     LOV 8/27/2021     Patient was asked to follow-up in:  [facility]

## 2021-09-21 ENCOUNTER — PATIENT MESSAGE (OUTPATIENT)
Dept: FAMILY MEDICINE CLINIC | Facility: CLINIC | Age: 86
End: 2021-09-21

## 2021-09-21 DIAGNOSIS — I10 ESSENTIAL HYPERTENSION: ICD-10-CM

## 2021-09-22 RX ORDER — AMLODIPINE BESYLATE 5 MG/1
5 TABLET ORAL 2 TIMES DAILY
Qty: 180 TABLET | Refills: 0 | Status: CANCELLED | OUTPATIENT
Start: 2021-09-22

## 2021-09-22 RX ORDER — SPIRONOLACTONE 25 MG/1
25 TABLET ORAL DAILY
Qty: 90 TABLET | Refills: 0 | Status: CANCELLED | OUTPATIENT
Start: 2021-09-22

## 2021-09-22 NOTE — TELEPHONE ENCOUNTER
Per 8/27/21 OV notes:  1. Essential hypertension  Blood pressure markedly elevated today. At last visit we stopped the amlodipine due to side effect and started losartan.

## 2021-09-22 NOTE — TELEPHONE ENCOUNTER
From: Maryul Greene  To: Cindy Pinon MD  Sent: 9/21/2021 8:45 PM CDT  Subject: Prescription refill    Good evening Dr. Katie Garner,     I am writing this email on behalf of my mother.  She doing rather well and her blood pressure is very much under contr

## 2021-09-27 RX ORDER — AMLODIPINE BESYLATE 5 MG/1
5 TABLET ORAL 2 TIMES DAILY
Qty: 60 TABLET | Refills: 1 | COMMUNITY
Start: 2021-09-27 | End: 2021-09-27

## 2021-09-27 RX ORDER — SPIRONOLACTONE 25 MG/1
25 TABLET ORAL DAILY
Qty: 30 TABLET | Refills: 0 | Status: SHIPPED | OUTPATIENT
Start: 2021-09-27 | End: 2021-10-22

## 2021-09-27 RX ORDER — AMLODIPINE BESYLATE 5 MG/1
5 TABLET ORAL 2 TIMES DAILY
Qty: 60 TABLET | Refills: 1 | Status: SHIPPED | OUTPATIENT
Start: 2021-09-27 | End: 2021-10-22

## 2021-09-27 NOTE — TELEPHONE ENCOUNTER
Spoke with son Gurpreet per HIPAA. Established 6/11/2021. Started on Amlodipine 5mg daily. To return in 1-2 months BP check. Son called 6/17/2021 with BP still running high. Amlodipine increased to 10mg daily or 5mg twice a day. Son reported swelling in both l

## 2021-10-21 ENCOUNTER — TELEPHONE (OUTPATIENT)
Dept: FAMILY MEDICINE CLINIC | Facility: CLINIC | Age: 86
End: 2021-10-21

## 2021-10-21 NOTE — TELEPHONE ENCOUNTER
Dr. Aida Cole, I received a request from [address] [place] [address] for patient to get a cane, shower chair, blood pressure monitor and urinary incontinence supplies.    Do you want patient to return to get documentation for these needs or can you add to your notes from 8

## 2021-10-21 NOTE — TELEPHONE ENCOUNTER
That's a whole new assessment, and would be best at a visit so we can get all the details to qualify him

## 2021-10-22 DIAGNOSIS — I10 ESSENTIAL HYPERTENSION: ICD-10-CM

## 2021-10-26 RX ORDER — SPIRONOLACTONE 25 MG/1
25 TABLET ORAL DAILY
Qty: 30 TABLET | Refills: 0 | Status: SHIPPED | OUTPATIENT
Start: 2021-10-26 | End: 2021-11-30

## 2021-10-26 RX ORDER — AMLODIPINE BESYLATE 5 MG/1
5 TABLET ORAL 2 TIMES DAILY
Qty: 60 TABLET | Refills: 0 | Status: SHIPPED | OUTPATIENT
Start: 2021-10-26 | End: 2021-11-30

## 2021-10-26 NOTE — TELEPHONE ENCOUNTER
Requested Prescriptions     Pending Prescriptions Disp Refills   • amLODIPine 5 MG Oral Tab 60 tablet 1     Sig: Take 1 tablet (5 mg total) by mouth 2 (two) times a day.    • spironolactone 25 MG Oral Tab 30 tablet 0     Sig: Take 1 tablet (25 mg total) by

## 2021-11-01 ENCOUNTER — OFFICE VISIT (OUTPATIENT)
Dept: FAMILY MEDICINE CLINIC | Facility: CLINIC | Age: 86
End: 2021-11-01
Payer: MEDICAID

## 2021-11-01 VITALS
TEMPERATURE: 99 F | HEIGHT: 58 IN | DIASTOLIC BLOOD PRESSURE: 62 MMHG | RESPIRATION RATE: 16 BRPM | SYSTOLIC BLOOD PRESSURE: 130 MMHG | BODY MASS INDEX: 30.86 KG/M2 | WEIGHT: 147 LBS | HEART RATE: 67 BPM | OXYGEN SATURATION: 98 %
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
## 2021-11-01 DIAGNOSIS — R26.9 GAIT DISTURBANCE: ICD-10-CM
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
## 2021-11-01 DIAGNOSIS — I10 ESSENTIAL HYPERTENSION: Primary | ICD-10-CM

## 2021-11-01 PROBLEM — R41.3 SHORT-TERM MEMORY LOSS: Status: RESOLVED | Noted: 2021-05-10 | Resolved: 2021-11-01

## 2021-11-01 PROBLEM — R41.0 DISORIENTATION: Status: RESOLVED | Noted: 2021-05-10 | Resolved: 2021-11-01

## 2021-11-01 PROBLEM — F41.9 ANXIETY: Status: ACTIVE | Noted: 2021-11-01

## 2021-11-01 PROBLEM — R41.0 CONFUSION: Status: RESOLVED | Noted: 2021-05-10 | Resolved: 2021-11-01

## 2021-11-01 PROCEDURE — 3078F DIAST BP <80 MM HG: CPT | Performed by: FAMILY MEDICINE

## 2021-11-01 PROCEDURE — 99213 OFFICE O/P EST LOW 20 MIN: CPT | Performed by: FAMILY MEDICINE

## 2021-11-01 PROCEDURE — 3008F BODY MASS INDEX DOCD: CPT | Performed by: FAMILY MEDICINE

## 2021-11-01 PROCEDURE — 3075F SYST BP GE 130 - 139MM HG: CPT | Performed by: FAMILY MEDICINE

## 2021-11-01 NOTE — PROGRESS NOTES
Patient presents with: Follow - Up: Documentation for home assisstance supplies     HPI:   Kin Massey is a 80year old female who presents to the office for assessment of need for home devices and BP recheck.      BP has been difficult to control, bu will need to refill meds for 90 days. 2. Fall in home, initial encounter  3. Gait disturbance  Need some assistance to help with stability. Uses cane, but very old. Will need new replacement cane.   Walking very well with cane assist  Legs tired in

## 2021-11-03 ENCOUNTER — TELEPHONE (OUTPATIENT)
Dept: FAMILY MEDICINE CLINIC | Facility: CLINIC | Age: 86
End: 2021-11-03

## 2021-11-03 DIAGNOSIS — M62.81 MUSCLE WEAKNESS (GENERALIZED): ICD-10-CM

## 2021-11-03 DIAGNOSIS — R32 URINARY INCONTINENCE, UNSPECIFIED TYPE: ICD-10-CM

## 2021-11-03 DIAGNOSIS — I10 ESSENTIAL HYPERTENSION: ICD-10-CM

## 2021-11-03 DIAGNOSIS — R26.9 ABNORMALITY OF GAIT: Primary | ICD-10-CM

## 2021-11-03 NOTE — TELEPHONE ENCOUNTER
Referral request [facility]  Fax number: [phone]     quantity 1   quantity 1   quantity 1   quantity 150   quantity 120    Office notes from Dr. Vania Garza M.D. 11/1/2021  HPI:   Austin Otto is a 80year old femal

## 2021-11-25 DIAGNOSIS — F41.9 ANXIETY: ICD-10-CM

## 2021-11-25 DIAGNOSIS — I10 ESSENTIAL HYPERTENSION: ICD-10-CM

## 2021-11-28 DIAGNOSIS — I10 ESSENTIAL HYPERTENSION: ICD-10-CM

## 2021-12-02 RX ORDER — CITALOPRAM 10 MG/1
10 TABLET ORAL DAILY
Qty: 90 TABLET | Refills: 3 | OUTPATIENT
Start: 2021-12-02

## 2021-12-02 RX ORDER — AMLODIPINE BESYLATE 5 MG/1
TABLET ORAL
Qty: 60 TABLET | Refills: 0 | OUTPATIENT
Start: 2021-12-02

## 2021-12-02 RX ORDER — AMLODIPINE BESYLATE 5 MG/1
5 TABLET ORAL 2 TIMES DAILY
Qty: 60 TABLET | Refills: 0 | OUTPATIENT
Start: 2021-12-02

## 2021-12-02 RX ORDER — ATENOLOL 50 MG/1
50 TABLET ORAL DAILY
Qty: 90 TABLET | Refills: 0 | OUTPATIENT
Start: 2021-12-02

## 2021-12-02 RX ORDER — SPIRONOLACTONE 25 MG/1
25 TABLET ORAL DAILY
Qty: 30 TABLET | Refills: 0 | OUTPATIENT
Start: 2021-12-02

## 2021-12-02 RX ORDER — SPIRONOLACTONE 25 MG/1
TABLET ORAL
Qty: 30 TABLET | Refills: 0 | OUTPATIENT
Start: 2021-12-02

## 2021-12-02 NOTE — TELEPHONE ENCOUNTER
citalopram 10 MG Oral Tab          Sig: Take 1 tablet (10 mg total) by mouth daily.     Disp:  90 tablet    Refills:  3    Start: 11/25/2021    Class: Normal    Non-formulary For: Anxiety    Last ordered: 3 months ago by Anmol Beauchamp MD         atenolol Hypertension Medications Protocol Passed

## 2021-12-13 ENCOUNTER — TELEPHONE (OUTPATIENT)
Dept: FAMILY MEDICINE CLINIC | Facility: CLINIC | Age: 86
End: 2021-12-13

## 2021-12-13 DIAGNOSIS — Z96.653 HISTORY OF TOTAL BILATERAL KNEE REPLACEMENT: Primary | ICD-10-CM

## 2021-12-13 NOTE — TELEPHONE ENCOUNTER
Son dropped off form for [facility]  [phone]  Fax #[phone]  Needs to be completed before patient can have a Deep Cleaning.  Son will  [phone]  Forms placed in triage

## 2021-12-14 PROBLEM — Z96.653 HISTORY OF TOTAL BILATERAL KNEE REPLACEMENT: Status: ACTIVE | Noted: 2021-12-14

## 2021-12-14 RX ORDER — AMOXICILLIN 500 MG/1
2000 TABLET, FILM COATED ORAL ONCE
Qty: 4 TABLET | Refills: 0 | Status: SHIPPED | OUTPATIENT
Start: 2021-12-14 | End: 2021-12-14

## 2022-02-21 RX ORDER — SPIRONOLACTONE 25 MG/1
25 TABLET ORAL DAILY
Qty: 90 TABLET | Refills: 0 | Status: SHIPPED | OUTPATIENT
Start: 2022-02-21

## 2022-03-02 RX ORDER — ATENOLOL 50 MG/1
TABLET ORAL
Qty: 90 TABLET | Refills: 0 | Status: SHIPPED | OUTPATIENT
Start: 2022-03-02 | End: 2022-03-25

## 2022-03-02 RX ORDER — ATENOLOL 50 MG/1
50 TABLET ORAL DAILY
Qty: 90 TABLET | Refills: 0 | OUTPATIENT
Start: 2022-03-02

## 2022-03-02 RX ORDER — AMLODIPINE BESYLATE 5 MG/1
5 TABLET ORAL 2 TIMES DAILY
Qty: 180 TABLET | Refills: 0 | OUTPATIENT
Start: 2022-03-02

## 2022-03-02 RX ORDER — AMLODIPINE BESYLATE 5 MG/1
TABLET ORAL
Qty: 180 TABLET | Refills: 0 | Status: SHIPPED | OUTPATIENT
Start: 2022-03-02 | End: 2022-03-25

## 2022-03-02 RX ORDER — CITALOPRAM 10 MG/1
10 TABLET ORAL DAILY
Qty: 90 TABLET | Refills: 3 | OUTPATIENT
Start: 2022-03-02

## 2022-04-25 ENCOUNTER — OFFICE VISIT (OUTPATIENT)
Dept: FAMILY MEDICINE CLINIC | Facility: CLINIC | Age: 87
End: 2022-04-25
Payer: MEDICAID

## 2022-04-25 VITALS
WEIGHT: 158 LBS | DIASTOLIC BLOOD PRESSURE: 72 MMHG | HEIGHT: 58 IN | HEART RATE: 70 BPM | SYSTOLIC BLOOD PRESSURE: 132 MMHG | TEMPERATURE: 98 F | OXYGEN SATURATION: 98 % | RESPIRATION RATE: 16 BRPM | BODY MASS INDEX: 33.17 KG/M2

## 2022-04-25 DIAGNOSIS — R06.00 DYSPNEA ON EXERTION: Primary | ICD-10-CM

## 2022-04-25 DIAGNOSIS — R29.898 NECK TIGHTNESS: ICD-10-CM

## 2022-04-25 DIAGNOSIS — I27.20 PULMONARY HYPERTENSION (HCC): ICD-10-CM

## 2022-04-25 DIAGNOSIS — I10 ESSENTIAL HYPERTENSION: ICD-10-CM

## 2022-04-25 PROCEDURE — 3075F SYST BP GE 130 - 139MM HG: CPT | Performed by: FAMILY MEDICINE

## 2022-04-25 PROCEDURE — 99214 OFFICE O/P EST MOD 30 MIN: CPT | Performed by: FAMILY MEDICINE

## 2022-04-25 PROCEDURE — 3078F DIAST BP <80 MM HG: CPT | Performed by: FAMILY MEDICINE

## 2022-04-25 PROCEDURE — 3008F BODY MASS INDEX DOCD: CPT | Performed by: FAMILY MEDICINE

## 2022-04-25 RX ORDER — AMLODIPINE BESYLATE 5 MG/1
5 TABLET ORAL 2 TIMES DAILY
Qty: 60 TABLET | Refills: 11 | COMMUNITY
Start: 2022-04-25

## 2022-05-18 DIAGNOSIS — I10 ESSENTIAL HYPERTENSION: ICD-10-CM

## 2022-05-18 RX ORDER — SPIRONOLACTONE 25 MG/1
TABLET ORAL
Qty: 90 TABLET | Refills: 0 | Status: SHIPPED | OUTPATIENT
Start: 2022-05-18

## 2022-05-19 ENCOUNTER — HOSPITAL ENCOUNTER (EMERGENCY)
Facility: HOSPITAL | Age: 87
Discharge: HOME | End: 2022-05-19
Attending: EMERGENCY MEDICINE
Payer: MEDICAID

## 2022-05-19 VITALS
RESPIRATION RATE: 18 BRPM | WEIGHT: 151 LBS | OXYGEN SATURATION: 96 % | SYSTOLIC BLOOD PRESSURE: 137 MMHG | BODY MASS INDEX: 32 KG/M2 | HEART RATE: 70 BPM | TEMPERATURE: 98 F | DIASTOLIC BLOOD PRESSURE: 59 MMHG

## 2022-05-19 DIAGNOSIS — E87.5 HYPERKALEMIA: Primary | ICD-10-CM

## 2022-05-19 LAB
ANION GAP SERPL CALC-SCNC: 5 MMOL/L (ref 0–18)
BUN BLD-MCNC: 33 MG/DL (ref 7–18)
CALCIUM BLD-MCNC: 10.2 MG/DL (ref 8.5–10.1)
CHLORIDE SERPL-SCNC: 106 MMOL/L (ref 98–112)
CO2 SERPL-SCNC: 24 MMOL/L (ref 21–32)
CREAT BLD-MCNC: 1.12 MG/DL
GLUCOSE BLD-MCNC: 117 MG/DL (ref 70–99)
OSMOLALITY SERPL CALC.SUM OF ELEC: 288 MOSM/KG (ref 275–295)
POTASSIUM SERPL-SCNC: 5.3 MMOL/L (ref 3.5–5.1)
SODIUM SERPL-SCNC: 135 MMOL/L (ref 136–145)

## 2022-05-19 PROCEDURE — 96360 HYDRATION IV INFUSION INIT: CPT

## 2022-05-19 PROCEDURE — 93005 ELECTROCARDIOGRAM TRACING: CPT

## 2022-05-19 PROCEDURE — 93010 ELECTROCARDIOGRAM REPORT: CPT

## 2022-05-19 PROCEDURE — 99284 EMERGENCY DEPT VISIT MOD MDM: CPT

## 2022-05-19 PROCEDURE — 80048 BASIC METABOLIC PNL TOTAL CA: CPT | Performed by: EMERGENCY MEDICINE

## 2022-05-20 NOTE — ED INITIAL ASSESSMENT (HPI)
86YF c/c of abnormal Pt state that she had blood work today and was told her K is 6.5 and referred to come in.  Pt state that was feeling achy

## 2022-05-21 ENCOUNTER — HOSPITAL ENCOUNTER (OUTPATIENT)
Age: 87
Discharge: STILL ADMITTED OR ADMINISTRATIVE | End: 2022-05-21
Payer: MEDICAID

## 2022-05-23 LAB
ATRIAL RATE: 67 BPM
P AXIS: 49 DEGREES
P-R INTERVAL: 160 MS
Q-T INTERVAL: 426 MS
QRS DURATION: 114 MS
QTC CALCULATION (BEZET): 450 MS
R AXIS: -1 DEGREES
T AXIS: 26 DEGREES
VENTRICULAR RATE: 67 BPM

## 2022-05-27 DIAGNOSIS — I10 ESSENTIAL HYPERTENSION: ICD-10-CM

## 2022-05-27 RX ORDER — ATENOLOL 50 MG/1
TABLET ORAL
Qty: 90 TABLET | Refills: 0 | Status: SHIPPED | OUTPATIENT
Start: 2022-05-27

## 2022-07-30 ENCOUNTER — HOSPITAL ENCOUNTER (OUTPATIENT)
Dept: ULTRASOUND IMAGING | Age: 87
Discharge: HOME | End: 2022-07-30
Attending: INTERNAL MEDICINE
Payer: MEDICAID

## 2022-07-30 DIAGNOSIS — M79.89 LEG SWELLING: ICD-10-CM

## 2022-07-30 PROCEDURE — 93970 EXTREMITY STUDY: CPT | Performed by: INTERNAL MEDICINE

## 2022-08-12 DIAGNOSIS — I10 ESSENTIAL HYPERTENSION: ICD-10-CM

## 2022-08-16 RX ORDER — SPIRONOLACTONE 25 MG/1
TABLET ORAL
Qty: 90 TABLET | Refills: 0 | Status: SHIPPED | OUTPATIENT
Start: 2022-08-16

## 2022-08-25 DIAGNOSIS — F41.9 ANXIETY: ICD-10-CM

## 2022-08-25 DIAGNOSIS — I10 ESSENTIAL HYPERTENSION: ICD-10-CM

## 2022-08-26 RX ORDER — ATENOLOL 50 MG/1
50 TABLET ORAL DAILY
Qty: 90 TABLET | Refills: 1 | Status: SHIPPED | OUTPATIENT
Start: 2022-08-26

## 2022-08-26 RX ORDER — CITALOPRAM HYDROBROMIDE 10 MG/1
TABLET ORAL
Qty: 90 TABLET | Refills: 3 | Status: SHIPPED | OUTPATIENT
Start: 2022-08-26

## (undated) DIAGNOSIS — F41.9 ANXIETY: ICD-10-CM

## (undated) DIAGNOSIS — I10 ESSENTIAL HYPERTENSION: ICD-10-CM

## (undated) NOTE — LETTER
Patient Name: Lita Klein  YOB: 1935          MRN number:  [id number]  Date:  8/2/2021  Referring Physician:  Amy Saucedo    Discharge Summary  Initial Functional Outcome Score 59/100  Final Functional Outcome Score 65/100  Number of Visits At weekly due to progress with FOTO score improvement from 50/100 to 65/100. Overall, feels improvement and ready for discharge to [facility].  TUG score improved from 16.9s at evaluation to 12.17s, tandem stance from 4s B at evaluation to 9s and >25s, FGA from 17/30 contact me at Dept: [phone].     Sincerely,  Electronically signed by therapist: Leslye Ritter, PT     Certification From: 1/1/8212  To:10/31/2021